# Patient Record
Sex: FEMALE | Race: AMERICAN INDIAN OR ALASKA NATIVE
[De-identification: names, ages, dates, MRNs, and addresses within clinical notes are randomized per-mention and may not be internally consistent; named-entity substitution may affect disease eponyms.]

---

## 2019-02-25 ENCOUNTER — HOSPITAL ENCOUNTER (EMERGENCY)
Dept: HOSPITAL 5 - ED | Age: 24
Discharge: HOME | End: 2019-02-25
Payer: MEDICAID

## 2019-02-25 VITALS — DIASTOLIC BLOOD PRESSURE: 72 MMHG | SYSTOLIC BLOOD PRESSURE: 102 MMHG

## 2019-02-25 DIAGNOSIS — K64.4: ICD-10-CM

## 2019-02-25 DIAGNOSIS — K52.9: Primary | ICD-10-CM

## 2019-02-25 LAB
ALBUMIN SERPL-MCNC: 4.2 G/DL (ref 3.9–5)
ALT SERPL-CCNC: 8 UNITS/L (ref 7–56)
APTT BLD: 31.2 SEC. (ref 24.2–36.6)
BASOPHILS # (AUTO): 0 K/MM3 (ref 0–0.1)
BASOPHILS NFR BLD AUTO: 0.2 % (ref 0–1.8)
BUN SERPL-MCNC: 14 MG/DL (ref 7–17)
BUN/CREAT SERPL: 18 %
CALCIUM SERPL-MCNC: 9 MG/DL (ref 8.4–10.2)
EOSINOPHIL # BLD AUTO: 0.1 K/MM3 (ref 0–0.4)
EOSINOPHIL NFR BLD AUTO: 1.4 % (ref 0–4.3)
HCT VFR BLD CALC: 36.9 % (ref 30.3–42.9)
HEMOLYSIS INDEX: 7
HGB BLD-MCNC: 12.7 GM/DL (ref 10.1–14.3)
INR PPP: 1.04 (ref 0.87–1.13)
LYMPHOCYTES # BLD AUTO: 2.4 K/MM3 (ref 1.2–5.4)
LYMPHOCYTES NFR BLD AUTO: 31 % (ref 13.4–35)
MCHC RBC AUTO-ENTMCNC: 34 % (ref 30–34)
MCV RBC AUTO: 90 FL (ref 79–97)
MONOCYTES # (AUTO): 0.5 K/MM3 (ref 0–0.8)
MONOCYTES % (AUTO): 6.3 % (ref 0–7.3)
PLATELET # BLD: 264 K/MM3 (ref 140–440)
RBC # BLD AUTO: 4.11 M/MM3 (ref 3.65–5.03)

## 2019-02-25 PROCEDURE — 85025 COMPLETE CBC W/AUTO DIFF WBC: CPT

## 2019-02-25 PROCEDURE — 36415 COLL VENOUS BLD VENIPUNCTURE: CPT

## 2019-02-25 PROCEDURE — 83690 ASSAY OF LIPASE: CPT

## 2019-02-25 PROCEDURE — 86901 BLOOD TYPING SEROLOGIC RH(D): CPT

## 2019-02-25 PROCEDURE — 85610 PROTHROMBIN TIME: CPT

## 2019-02-25 PROCEDURE — 80053 COMPREHEN METABOLIC PANEL: CPT

## 2019-02-25 PROCEDURE — 86850 RBC ANTIBODY SCREEN: CPT

## 2019-02-25 PROCEDURE — 85730 THROMBOPLASTIN TIME PARTIAL: CPT

## 2019-02-25 PROCEDURE — 86900 BLOOD TYPING SEROLOGIC ABO: CPT

## 2019-02-25 NOTE — EMERGENCY DEPARTMENT REPORT
Vomiting/Diarrhea





- HPI


Chief Complaint: GI Bleed


Stated Complaint: N/V


Time Seen by Provider: 02/25/19 09:00


Duration: 3 Days


Severity: mild


Nausea/Vomiting Severity: None


Diarrhea Severity: Mild


Pain Location: Generalized (abdominal but resolved 2 days ago)


Pain Severity: None


Symptoms: Yes Watery Diarrhea, Yes Bloody diarrhea (1 episode with bright red 

only a drop), Yes Able to Tolerate Fluids, Yes Recent Unusual Foods, No Fever, 

No Recent Untreated Water, No Recent use of Antibiotics, No Family w/ Similar 

Symptoms, No Contacts w/ Similar Symptoms, No Rash, No Hematuria, No Recent URI 

Symptoms


Other History: Patient is a 23-year-old healthy female presents to ED 

complaining of one episode of seeing blood in her stool.  She denies 

fevers/chills/nausea vomiting/abdominal pain.





ED Review of Systems


ROS: 


Stated complaint: N/V


Other details as noted in HPI





Comment: All other systems reviewed and negative





ED Past Medical Hx





- Past Medical History


Previous Medical History?: Yes


Hx Hypertension: No


Hx Diabetes: No


Hx Deep Vein Thrombosis: No


Hx Renal Disease: No


Hx Sickle Cell Disease: No


Hx Seizures: No


Hx Asthma: No


Hx HIV: No


Additional medical history: colitis.  c-diff (May 2018)





- Surgical History


Past Surgical History?: No





- Social History


Smoking Status: Never Smoker


Substance Use Type: None





- Medications


Home Medications: 


                                Home Medications











 Medication  Instructions  Recorded  Confirmed  Last Taken  Type


 


Mag Hydrox/Aluminum Hyd/Simeth 15 ml PO TID #1 oral.susp 02/25/19  Unknown Rx





[Maalox Advanced Suspension]     














Vomiting Diarrhea Exam





- Exam


General: 


Vital signs noted. No distress. Alert and acting appropriately.





HEENT: Yes Moist Mucous Membranes, No Pharyngeal Erythema, No Pharyngeal 

Exudates, No Rhinorrhea, No Conjuctival Injection, No Frontal Tenderness, No 

Maxillary Tenderness


Neck: No Adenopathy, No Rigidity


Lungs: Yes Clear Lung Sounds, Yes Good Air Exchange, No Wheezes, No Stridor, No 

Cough, No Nasal Flaring, No Retractions, No Use of Accessory Muscles


Heart exam: Regular: Yes, Murmur: No, Tachycardia: No


Abdomen: Tenderness: No, Peritoneal Signs: No, Distention: No, Hyperactive Bowel

sounds: No


Skin exam: Rash: No, Edema: No, Normal turgor: Yes


Neurologic: 


Alert and oriented, no deficits.








Musculoskeletal: 


Unremarkable.








Exam: Rectum: Mild first-degree hemorrhoid, nonbleeding, no swelling.





ED Course


                                   Vital Signs











  02/25/19 02/25/19





  07:31 08:00


 


Temperature 97.4 F L 


 


Pulse Rate 75 


 


Respiratory 16 18





Rate  


 


Blood Pressure 102/72 


 


O2 Sat by Pulse 100 99





Oximetry  














ED Medical Decision Making





- Lab Data


Result diagrams: 


                                 02/25/19 07:51





                                 02/25/19 07:51





- Medical Decision Making





23-year-old female presents to mild gastroenteritis


All labs are within normal limits.


Patient is in no acute distress upon evaluation, nontender abdomen


Vital signs are normal, she is in no acute distress,


Critical care attestation.: 


If time is entered above; I have spent that time in minutes in the direct care 

of this critically ill patient, excluding procedure time.








ED Disposition


Clinical Impression: 


 Gastroenteritis, External hemorrhoid





Disposition: DC-01 TO HOME OR SELFCARE


Is pt being admited?: No


Does the pt Need Aspirin: No


Condition: Stable


Instructions:  Gastroenteritis (ED), Hemorrhoids (ED)


Additional Instructions: 


Make sure to follow up with the primary care physician as discussed.


Take all your medications as you've been prescribed.


If you have any worsening symptoms or develop new symptoms please return to ED 

immediately.


Prescriptions: 


Mag Hydrox/Aluminum Hyd/Simeth [Maalox Advanced Suspension] 15 ml PO TID #1 

oral.susp


Referrals: 


[facility], [place] [Primary Care Provider] - 3-5 Days


Forms:  Accompanied Note, Work/School Release Form(ED)


Time of Disposition: 09:29

## 2019-11-20 ENCOUNTER — HOSPITAL ENCOUNTER (EMERGENCY)
Dept: HOSPITAL 5 - ED | Age: 24
Discharge: HOME | End: 2019-11-20
Payer: MEDICAID

## 2019-11-20 VITALS — SYSTOLIC BLOOD PRESSURE: 110 MMHG | DIASTOLIC BLOOD PRESSURE: 70 MMHG

## 2019-11-20 DIAGNOSIS — J06.9: Primary | ICD-10-CM

## 2019-11-20 DIAGNOSIS — Z79.899: ICD-10-CM

## 2019-11-20 NOTE — EMERGENCY DEPARTMENT REPORT
- General


Chief Complaint: Upper Respiratory Infection


Stated Complaint: WEAK/BODY ACHES


Time Seen by Provider: 11/20/19 08:33


Source: patient


Mode of arrival: Ambulatory


Limitations: No Limitations





- History of Present Illness


MD Complaint: rhinorrhea, nasal congestion


-: Sudden, days(s) (1)


Consistency: constant


Improves With: nothing


Worsens With: nothing


Associated Symptoms: chills, rhinorrhea, nasal congestion.  denies: myalgias, 

diaphoresis, chest pain, shortness of breath, nausea, vomiting





- Related Data


                                  Previous Rx's











 Medication  Instructions  Recorded  Last Taken  Type


 


Mag Hydrox/Aluminum Hyd/Simeth 15 ml PO TID #1 oral.susp 02/25/19 Unknown Rx





[Maalox Advanced Suspension]    


 


Brompheniramine/Pseudoephed/Dm 5 ml PO Q6H PRN #240 syrup 11/20/19 Unknown Rx





[Zbmerocqbl-Rjkbkrfuekx-Qt Syr]    


 


Ketorolac [Toradol] 10 mg PO Q6H PRN #15 tablet 11/20/19 Unknown Rx


 


predniSONE [Deltasone] 50 mg PO QDAY #5 tab 11/20/19 Unknown Rx











                                    Allergies











Allergy/AdvReac Type Severity Reaction Status Date / Time


 


No Known Allergies Allergy   Verified 07/16/15 13:51














ED Review of Systems


ROS: 


Stated complaint: WEAK/BODY ACHES


Other details as noted in HPI





Comment: All other systems reviewed and negative





ED Past Medical Hx





- Past Medical History


Previous Medical History?: No


Hx Hypertension: No


Hx Diabetes: No


Hx Deep Vein Thrombosis: No


Hx Renal Disease: No


Hx Sickle Cell Disease: No


Hx Seizures: No


Hx Asthma: No


Hx HIV: No


Additional medical history: colitis.  c-diff (May 2018)





- Surgical History


Past Surgical History?: No





- Social History


Smoking Status: Never Smoker


Substance Use Type: None





- Medications


Home Medications: 


                                Home Medications











 Medication  Instructions  Recorded  Confirmed  Last Taken  Type


 


Mag Hydrox/Aluminum Hyd/Simeth 15 ml PO TID #1 oral.susp 02/25/19  Unknown Rx





[Maalox Advanced Suspension]     


 


Brompheniramine/Pseudoephed/Dm 5 ml PO Q6H PRN #240 syrup 11/20/19  Unknown Rx





[Bcdynfsrnv-Qezfprnqtrh-Pr Syr]     


 


Ketorolac [Toradol] 10 mg PO Q6H PRN #15 tablet 11/20/19  Unknown Rx


 


predniSONE [Deltasone] 50 mg PO QDAY #5 tab 11/20/19  Unknown Rx














ED Physical Exam





- General


Limitations: No Limitations


General appearance: alert, in no apparent distress





- Head


Head exam: Present: atraumatic, normocephalic





- Eye


Eye exam: Present: normal appearance, PERRL, EOMI





- ENT


ENT exam: Present: normal orophraynx, mucous membranes moist, other (nasal 

congestion bilaterally cleared for discharge.  Posterior pharynx is clear.  

Airway patent.  No lymphadenopathy noted.)





- Neck


Neck exam: Present: normal inspection





- Respiratory


Respiratory exam: Present: normal lung sounds bilaterally.  Absent: respiratory 

distress





- Cardiovascular


Cardiovascular Exam: Present: regular rate, normal rhythm.  Absent: systolic 

murmur, diastolic murmur, rubs, gallop





- GI/Abdominal


GI/Abdominal exam: Present: soft, normal bowel sounds





- Extremities Exam


Extremities exam: Present: normal inspection





- Back Exam


Back exam: Present: normal inspection





- Neurological Exam


Neurological exam: Present: alert, oriented X3





- Psychiatric


Psychiatric exam: Present: normal affect, normal mood





- Skin


Skin exam: Present: warm, dry, intact, normal color.  Absent: rash





ED Course





                                   Vital Signs











  11/20/19





  08:18


 


Pulse Rate 112 H


 


Respiratory 16





Rate 


 


Blood Pressure 109/66





[Right] 


 


O2 Sat by Pulse 99





Oximetry 











Critical care attestation.: 


If time is entered above; I have spent that time in minutes in the direct care 

of this critically ill patient, excluding procedure time.








ED Disposition


Clinical Impression: 


 Upper respiratory infection





Disposition: DC-01 TO HOME OR SELFCARE


Is pt being admited?: No


Does the pt Need Aspirin: No


Condition: Stable


Instructions:  Upper Respiratory Infection (ED), Cold Symptoms (ED), Viral 

Syndrome (ED)


Prescriptions: 


Brompheniramine/Pseudoephed/Dm [Kowseynoom-Ydztrzhyall-Az Syr] 5 ml PO Q6H PRN 

#240 syrup


 PRN Reason: Cough


predniSONE [Deltasone] 50 mg PO QDAY #5 tab


Ketorolac [Toradol] 10 mg PO Q6H PRN #15 tablet


 PRN Reason: Pain


Referrals: 


[facility] [Provider Group] - 3-5 Days

## 2022-02-09 NOTE — EMERGENCY DEPARTMENT REPORT
ED Dysuria HPI





- HPI


Chief Complaint: Urogenital-Female


Stated Complaint: POSS YEAST INFECTION


Duration: 2 Days


Location of Discomfort: Other


Severity: Mild


Symptoms: Dysuria: No, Frequency: No, Suprapubic Pain: No, Flank Pain: No, 

Fever: No, Hematuria: No, Abdominal Pain: No, Previous UTI's: No


Other History: Ms. Rodriguez is a 26-year-old female that comes to the emergency 

room with white vaginal discharge after completing her antibiotics.  She did 

call her doctor but they were unable to get her in so she presents to the ER.  

She is not concerned for STDs.  She has no dysuria.  No abdominal pain.  No back

pain.  No fever or chills.  She did not try any Monistat over-the-counter.  She 

comes to the emergency room for Diflucan.





ED Review of Systems


ROS: 


Stated complaint: POSS YEAST INFECTION


Other details as noted in HPI





Comment: All other systems reviewed and negative





ED Past Medical Hx





- Past Medical History


Previous Medical History?: No


Hx Hypertension: No


Hx Diabetes: No


Hx Deep Vein Thrombosis: No


Hx Renal Disease: No


Hx Sickle Cell Disease: No


Hx Seizures: Yes


Hx Asthma: No


Hx HIV: No


Additional medical history: colitis.  c-diff (May 2018)





- Surgical History


Past Surgical History?: No





- Family History


Family history: no significant





- Social History


Smoking Status: Never Smoker


Substance Use Type: None





- Medications


Home Medications: 


                                Home Medications











 Medication  Instructions  Recorded  Confirmed  Last Taken  Type


 


Mag Hydrox/Aluminum Hyd/Simeth 15 ml PO TID #1 oral.susp 02/25/19  Unknown Rx





[Maalox Advanced Suspension]     


 


Brompheniramine/Pseudoephed/Dm 5 ml PO Q6H PRN #240 syrup 11/20/19  Unknown Rx





[Ewsootznwi-Cipwgvfpvao-Kd Syr]     


 


Ketorolac [Toradol] 10 mg PO Q6H PRN #15 tablet 11/20/19  Unknown Rx


 


predniSONE [Deltasone] 50 mg PO QDAY #5 tab 11/20/19  Unknown Rx


 


Acetaminophen/Codeine [Tylenol 1 tab PO Q6H PRN #12 tab 01/11/20  Unknown Rx





/Codeine # 3 tab]     


 


Ibuprofen [Motrin] 600 mg PO Q8H PRN #24 tablet 01/11/20  Unknown Rx


 


Neomycn/Bacitrc/Polymyx/Pramox 1 applicatio TP Q8H #1 tube 01/11/20  Unknown Rx





[Neosporin + Pain Relief Oint]     


 


cephALEXin [Keflex] 500 mg PO Q8HR #30 cap 01/11/20  Unknown Rx


 


Fluconazole [Diflucan TAB] 100 mg PO QDAY #3 tablet 02/09/22  Unknown Rx














Dysuria Exam





- Exam


General: 


Vital signs noted. No distress. Alert and acting appropriately.





Exam: Yes Moist Mucous Membranes, No CVA Tenderness, No Abdominal Tenderness, No

Rigidity or Guarding





ED Course


                                   Vital Signs











  02/09/22





  08:51


 


Temperature 98.0 F


 


Pulse Rate 72


 


Respiratory 14





Rate 


 


Blood Pressure 105/62


 


O2 Sat by Pulse 98





Oximetry 














ED Medical Decision Making





- Medical Decision Making





Patient has no concern for STDs.  She has no dysuria.  No abdominal pain.  No 

nausea vomiting diarrhea.  No fever or chills.





Thick white discharge after completing her antibiotics.  She was on amoxicillin 

for 10 days.





Patient educated on over-the-counter yeast treatment.





I have given her Diflucan tabs.





Patient being discharged home with discharge plan of care including meds, diet, 

activity and follow-up.  She understands she needs to follow-up with her OB/GYN.





                                   Vital Signs











  02/09/22





  08:51


 


Temperature 98.0 F


 


Pulse Rate 72


 


Respiratory 14





Rate 


 


Blood Pressure 105/62


 


O2 Sat by Pulse 98





Oximetry 














- Differential Diagnosis


Vaginitis


Critical care attestation.: 


If time is entered above; I have spent that time in minutes in the direct care 

of this critically ill patient, excluding procedure time.








ED Disposition


Clinical Impression: 


Vaginitis


Qualifiers:


 Chronicity: acute Qualified Code(s): N76.0 - Acute vaginitis





Disposition: 01 HOME / SELF CARE / HOMELESS


Is pt being admited?: No


Does the pt Need Aspirin: No


Condition: Stable


Additional Instructions: 


FOLLOW UP WITH OBGYN AS WE DISCUSSED





EAT YOGURT DAILY





MED AS ORDERED TODAY





STAY WELL HYDRATED


Prescriptions: 


Fluconazole [Diflucan TAB] 100 mg PO QDAY #3 tablet


Referrals: 


HUYEN BUCHANAN MD [Staff Physician] - 3-5 Days


Time of Disposition: 09:04

## 2022-05-07 NOTE — EMERGENCY DEPARTMENT REPORT
ED General Adult HPI





- General


Chief complaint: Skin/Abscess/Foreign Body


Stated complaint: BOIL UNDER ARM/IRRATION


Time Seen by Provider: 05/07/22 12:17


Source: patient


Mode of arrival: Ambulatory


Limitations: No Limitations





- History of Present Illness


Initial comments: 


26-year-old female who presents for boil to right axillary.  Patient has history

of hydradenitis.  States this is a recurring issue for her.  Patient took 

amoxicillin last week however did not get better.  Patient denies fevers or 

chills no nausea no vomiting.  States that she would manually express some pus 

this morning in the shower.  Symptoms are exacerbated by palpation.  Symptoms 

are relieved by nothing tried.  Patient does not have diabetes.








- Related Data


                                  Previous Rx's











 Medication  Instructions  Recorded  Last Taken  Type


 


Mag Hydrox/Aluminum Hyd/Simeth 15 ml PO TID #1 oral.susp 02/25/19 Unknown Rx





[Maalox Advanced Suspension]    


 


Brompheniramine/Pseudoephed/Dm 5 ml PO Q6H PRN #240 syrup 11/20/19 Unknown Rx





[Kcxtliczmr-Wtrwsowysji-Eh Syr]    


 


Ketorolac [Toradol] 10 mg PO Q6H PRN #15 tablet 11/20/19 Unknown Rx


 


predniSONE [Deltasone] 50 mg PO QDAY #5 tab 11/20/19 Unknown Rx


 


Acetaminophen/Codeine [Tylenol 1 tab PO Q6H PRN #12 tab 01/11/20 Unknown Rx





/Codeine # 3 tab]    


 


Ibuprofen [Motrin] 600 mg PO Q8H PRN #24 tablet 01/11/20 Unknown Rx


 


Neomycn/Bacitrc/Polymyx/Pramox 1 applicatio TP Q8H #1 tube 01/11/20 Unknown Rx





[Neosporin + Pain Relief Oint]    


 


cephALEXin [Keflex] 500 mg PO Q8HR #30 cap 01/11/20 Unknown Rx


 


Fluconazole [Diflucan TAB] 100 mg PO QDAY #3 tablet 02/09/22 Unknown Rx


 


cephALEXin [Keflex] 500 mg PO Q8H 7 Days #21 cap 05/07/22 Unknown Rx


 


traMADoL [Ultram] 50 mg PO Q6HR PRN #12 tablet 05/07/22 Unknown Rx











                                    Allergies











Allergy/AdvReac Type Severity Reaction Status Date / Time


 


No Known Allergies Allergy   Verified 07/16/15 13:51














ED Review of Systems


ROS: 


Stated complaint: BOIL UNDER ARM/IRRATION


Other details as noted in HPI





Constitutional: denies: chills, fever


Eyes: denies: eye pain, eye discharge, vision change


ENT: denies: ear pain, throat pain


Respiratory: denies: cough, shortness of breath, wheezing


Cardiovascular: denies: chest pain, palpitations


Endocrine: no symptoms reported


Gastrointestinal: denies: abdominal pain, nausea, diarrhea


Genitourinary: denies: urgency, dysuria, discharge


Musculoskeletal: denies: back pain, joint swelling, arthralgia


Skin: other (Abscess right axillary 1 x 2 cm)


Neurological: denies: headache, weakness, paresthesias


Psychiatric: denies: anxiety, depression


Hematological/Lymphatic: denies: easy bleeding, easy bruising





ED Past Medical Hx





- Past Medical History


Previous Medical History?: Yes


Hx Hypertension: No


Hx Diabetes: No


Hx Deep Vein Thrombosis: No


Hx Renal Disease: No


Hx Sickle Cell Disease: No


Hx Seizures: Yes


Hx Asthma: No


Hx HIV: No


Additional medical history: colitis.  c-diff (May 2018), Boil





- Surgical History


Past Surgical History?: No





- Social History


Smoking Status: Never Smoker


Substance Use Type: None





- Medications


Home Medications: 


                                Home Medications











 Medication  Instructions  Recorded  Confirmed  Last Taken  Type


 


Mag Hydrox/Aluminum Hyd/Simeth 15 ml PO TID #1 oral.susp 02/25/19  Unknown Rx





[Maalox Advanced Suspension]     


 


Brompheniramine/Pseudoephed/Dm 5 ml PO Q6H PRN #240 syrup 11/20/19  Unknown Rx





[Pvbuifpime-Hgalusaecjm-Nj Syr]     


 


Ketorolac [Toradol] 10 mg PO Q6H PRN #15 tablet 11/20/19  Unknown Rx


 


predniSONE [Deltasone] 50 mg PO QDAY #5 tab 11/20/19  Unknown Rx


 


Acetaminophen/Codeine [Tylenol 1 tab PO Q6H PRN #12 tab 01/11/20  Unknown Rx





/Codeine # 3 tab]     


 


Ibuprofen [Motrin] 600 mg PO Q8H PRN #24 tablet 01/11/20  Unknown Rx


 


Neomycn/Bacitrc/Polymyx/Pramox 1 applicatio TP Q8H #1 tube 01/11/20  Unknown Rx





[Neosporin + Pain Relief Oint]     


 


cephALEXin [Keflex] 500 mg PO Q8HR #30 cap 01/11/20  Unknown Rx


 


Fluconazole [Diflucan TAB] 100 mg PO QDAY #3 tablet 02/09/22  Unknown Rx


 


cephALEXin [Keflex] 500 mg PO Q8H 7 Days #21 cap 05/07/22  Unknown Rx


 


traMADoL [Ultram] 50 mg PO Q6HR PRN #12 tablet 05/07/22  Unknown Rx














ED Physical Exam





- General


Limitations: No Limitations


General appearance: alert, in no apparent distress





- Head


Head exam: Present: atraumatic, normocephalic





- Eye


Eye exam: Present: normal appearance, EOMI


Pupils: Present: normal accommodation





- ENT


ENT exam: Present: mucous membranes moist





- Neck


Neck exam: Present: normal inspection, full ROM.  Absent: tenderness





- Respiratory


Respiratory exam: Present: normal lung sounds bilaterally.  Absent: respiratory 

distress, wheezes, stridor





- Cardiovascular


Cardiovascular Exam: Present: regular rate, normal rhythm, normal heart sounds. 

 Absent: systolic murmur, diastolic murmur, rubs, gallop





- GI/Abdominal


GI/Abdominal exam: Present: soft, normal bowel sounds.  Absent: distended, 

tenderness





- Rectal


Rectal exam: Present: deferred





- Extremities Exam


Extremities exam: Present: normal inspection





- Back Exam


Back exam: Present: normal inspection, full ROM.  Absent: CVA tenderness (R), 

CVA tenderness (L)





- Neurological Exam


Neurological exam: Present: alert, oriented X3, CN II-XII intact





- Psychiatric


Psychiatric exam: Present: normal affect





- Skin


Skin exam: Present: warm, dry, erythema, other (Abscess right axillary 1 x 2 cm 

fluctuant erythema painful to touch warm to touch no drainage.)





ED Course


                                   Vital Signs











  05/07/22





  10:52


 


Temperature 98.1 F


 


Pulse Rate 85


 


Respiratory 18





Rate 


 


Blood Pressure 108/70


 


O2 Sat by Pulse 98





Oximetry 














- I & D


  ** Right Arm


Type of Procedure: Simple


Site: Right axillary abscess 1 x 2 cm there is no erythema fluctuance warm to 

hilda


Blade Size: 11


I & D Procedure: betadine prep, sterile drapes applied, sterile dressing applied


Progress: 


Right axillar abscess anesthesia with 1% lidocaine x2 cc anesthesia is achieved.

  Site cleaned with Betadine solution.  Incision with 11 blade scalpel.  

Loculations broken up with six-inch blunt forceps.  There is moderate purulent 

drainage.  Site irrigated with 10 cc sterile saline.  Sterile dressing applied. 

 Patient given wound care instructions.  Patient verbalized agreement 

understanding of same. patient tolerated procedure with minimal distress. 








ED Medical Decision Making





- Medical Decision Making


Abscess right axillary incision and drainage see procedure note.  Sterile 

dressings intact patient given follow-up care instructions including follow-up 

with primary care doctor in 2 days.  And wound care instructions.  Patient DC to

 home in stable condition at this time.





Critical care attestation.: 


If time is entered above; I have spent that time in minutes in the direct care 

of this critically ill patient, excluding procedure time.








ED Disposition


Clinical Impression: 


 Hydradenitis, Abscess of axilla, right





Disposition: 01 HOME / SELF CARE / HOMELESS


Is pt being admited?: No


Does the pt Need Aspirin: No


Condition: Stable


Instructions:  Incision and Drainage, Care After, Hidradenitis Suppurativa


Additional Instructions: 


Take medications as prescribed, follow-up with your primary care doctor in 2 to 

3 days.  Return to emergency department should symptoms worsen.


Prescriptions: 


cephALEXin [Keflex] 500 mg PO Q8H 7 Days #21 cap


traMADoL [Ultram] 50 mg PO Q6HR PRN #12 tablet


 PRN Reason: Pain


Referrals: 


SHARI COURTNEY DO [Staff Physician] - 3-5 Days


Forms:  Work/School Release Form(ED)


Time of Disposition: 13:10

## 2022-08-13 NOTE — XRAY REPORT
LEFT KNEE 3 VIEW(S)



INDICATION / CLINICAL INFORMATION: leg pain



COMPARISON: None available.

 

FINDINGS:



BONES / JOINT(S): No acute fracture or subluxation. No significant arthritis.



SOFT TISSUES: No significant abnormality.



ADDITIONAL FINDINGS: None.







Signer Name: Zohaib Erwin DO 

Signed: 8/13/2022 12:45 PM

Workstation Name: [id number]

## 2022-08-13 NOTE — EMERGENCY DEPARTMENT REPORT
ED General Adult HPI





- General


Chief complaint: Extremity Injury, Lower


Stated complaint: L LEG SWOLLEN AND PAIN LEFT SHOULDER PAIN


Time Seen by Provider: 22 10:43


Source: patient


Mode of arrival: Ambulatory


Limitations: No Limitations





- History of Present Illness


Initial comments: 





27-year-old female no significant past medical history reports to the ER with 

complaints of posterior left knee pain with swelling for about 1 month.  Patient

reports no shortness of breath, no history of smoking, currently on no birth 

control.  No reports of long travel within the car.  Patient does report she 

stands a lot at work.  Patient reports pain is about 7 out of 10.  Patient 

reports no other acute signs or symptoms.


Severity scale (0 -10): 5





- Related Data


                                  Previous Rx's











 Medication  Instructions  Recorded  Last Taken  Type


 


Mag Hydrox/Aluminum Hyd/Simeth 15 ml PO TID #1 oral.susp 19 Unknown Rx





[Maalox Advanced Suspension]    


 


Brompheniramine/Pseudoephed/Dm 5 ml PO Q6H PRN #240 syrup 19 Unknown Rx





[Ouhszvbitg-Lvcluxbwaky-St Syr]    


 


Ketorolac [Toradol] 10 mg PO Q6H PRN #15 tablet 19 Unknown Rx


 


predniSONE [Deltasone] 50 mg PO QDAY #5 tab 19 Unknown Rx


 


Acetaminophen/Codeine [Tylenol 1 tab PO Q6H PRN #12 tab 20 Unknown Rx





/Codeine # 3 tab]    


 


Ibuprofen [Motrin] 600 mg PO Q8H PRN #24 tablet 20 Unknown Rx


 


Neomycn/Bacitrc/Polymyx/Pramox 1 applicatio TP Q8H #1 tube 20 Unknown Rx





[Neosporin + Pain Relief Oint]    


 


cephALEXin [Keflex] 500 mg PO Q8HR #30 cap 20 Unknown Rx


 


Fluconazole [Diflucan TAB] 100 mg PO QDAY #3 tablet 22 Unknown Rx


 


cephALEXin [Keflex] 500 mg PO Q8H 7 Days #21 cap 22 Unknown Rx


 


traMADoL [Ultram] 50 mg PO Q6HR PRN #12 tablet 22 Unknown Rx


 


Ibuprofen [Motrin] 800 mg PO Q8HR PRN 6 Days #18 22 Unknown Rx





 tablet   


 


methOCARBAMOL [Robaxin TAB] 500 mg PO BID PRN 7 Days #14 tab 22 Unknown Rx











                                    Allergies











Allergy/AdvReac Type Severity Reaction Status Date / Time


 


No Known Allergies Allergy   Verified 07/16/15 13:51














ED Review of Systems


ROS: 


Stated complaint: L LEG SWOLLEN AND PAIN LEFT SHOULDER PAIN


Other details as noted in HPI





Comment: All other systems reviewed and negative


Musculoskeletal: other (Left knee posterior pain with slight swelling noted.)





ED Past Medical Hx





- Past Medical History


Previous Medical History?: Yes


Hx Hypertension: No


Hx Diabetes: No


Hx Deep Vein Thrombosis: No


Hx Renal Disease: No


Hx Sickle Cell Disease: No


Hx Seizures: Yes


Hx Asthma: No


Hx HIV: No


Additional medical history: colitis.  c-diff (May 2018), Boil, Vaginal delivery 

x 3





- Surgical History


Past Surgical History?: No





- Social History


Smoking Status: Never Smoker


Substance Use Type: None





- Medications


Home Medications: 


                                Home Medications











 Medication  Instructions  Recorded  Confirmed  Last Taken  Type


 


Mag Hydrox/Aluminum Hyd/Simeth 15 ml PO TID #1 oral.susp 19  Unknown Rx





[Maalox Advanced Suspension]     


 


Brompheniramine/Pseudoephed/Dm 5 ml PO Q6H PRN #240 syrup 19  Unknown Rx





[Mmcfsyrjbc-Wqxfrrkmxip-Bj Syr]     


 


Ketorolac [Toradol] 10 mg PO Q6H PRN #15 tablet 19  Unknown Rx


 


predniSONE [Deltasone] 50 mg PO QDAY #5 tab 19  Unknown Rx


 


Acetaminophen/Codeine [Tylenol 1 tab PO Q6H PRN #12 tab 20  Unknown Rx





/Codeine # 3 tab]     


 


Ibuprofen [Motrin] 600 mg PO Q8H PRN #24 tablet 20  Unknown Rx


 


Neomycn/Bacitrc/Polymyx/Pramox 1 applicatio TP Q8H #1 tube 20  Unknown Rx





[Neosporin + Pain Relief Oint]     


 


cephALEXin [Keflex] 500 mg PO Q8HR #30 cap 20  Unknown Rx


 


Fluconazole [Diflucan TAB] 100 mg PO QDAY #3 tablet 22  Unknown Rx


 


cephALEXin [Keflex] 500 mg PO Q8H 7 Days #21 cap 22  Unknown Rx


 


traMADoL [Ultram] 50 mg PO Q6HR PRN #12 tablet 22  Unknown Rx


 


Ibuprofen [Motrin] 800 mg PO Q8HR PRN 6 Days #18 22  Unknown Rx





 tablet    


 


methOCARBAMOL [Robaxin TAB] 500 mg PO BID PRN 7 Days #14 tab 22  Unknown 

Rx














ED Physical Exam





- General


Limitations: No Limitations


General appearance: alert, in no apparent distress





- Head


Head exam: Present: atraumatic, normocephalic





- Eye


Eye exam: Present: normal appearance





- ENT


ENT exam: Present: mucous membranes moist





- Neck


Neck exam: Present: normal inspection





- Respiratory


Respiratory exam: Present: normal lung sounds bilaterally.  Absent: respiratory 

distress





- Cardiovascular


Cardiovascular Exam: Present: regular rate, normal rhythm.  Absent: systolic 

murmur, diastolic murmur, rubs, gallop





- GI/Abdominal


GI/Abdominal exam: Present: soft, normal bowel sounds





- Extremities Exam


Extremities exam: Present: normal inspection





- Expanded Lower Extremity Exam


  ** Left


Knee exam: Present: full ROM, tenderness (Posterior), swelling (Posterior).  

Absent: erythema





- Back Exam


Back exam: Present: normal inspection





- Neurological Exam


Neurological exam: Present: alert, oriented X3





- Psychiatric


Psychiatric exam: Present: normal affect, normal mood





- Skin


Skin exam: Present: warm, dry, intact, normal color.  Absent: rash





ED Course


                                   Vital Signs











  22





  08:47


 


Temperature 98.8 F


 


Pulse Rate 84


 


Respiratory 20





Rate 


 


Blood Pressure 104/70





[Right] 


 


O2 Sat by Pulse 99





Oximetry 














ED Medical Decision Making





- Radiology Data





[facility]  


                                     [address] [place] [address]  


 


                                            XRay Report   


                                               Signed  


 


Patient: JUAN CARLOS HILLS                                                  

              MR#: [id number]  


[id number]          


: 1995                                                                

Acct:[id number]      


 


Age/Sex: 27 / F                                                                

ADM Date: 22     


 


Loc: ED       


Attending Dr:   


 


 


Ordering Physician: BENTLEY FRANKLIN NP  


Date of Service: 22  


Procedure(s): XR knee 3V LT  


Accession Number(s): [id number]  


 


cc: BENTLEY FRANKLIN NP   


 


Fluoro Time In Minutes:   


 


LEFT KNEE 3 VIEW(S)  


 


 INDICATION / CLINICAL INFORMATION: leg pain  


 


 COMPARISON: None available.  


 


 FINDINGS:  


 


 BONES / JOINT(S): No acute fracture or subluxation. No significant arthritis.  


 


 SOFT TISSUES: No significant abnormality.  


 


 ADDITIONAL FINDINGS: None.  


 


 


 


 Signer Name: Zohaib POPE DO Rip   


 Signed: 2022 12:45 PM  


 Workstation Name: [id number]   


 


 


Transcribed By: NS  


Dictated By: ZOHAIB TREJO DO  


Electronically Authenticated By: ZOHAIB TREJO DO    


Signed Date/Time: 22 1245                                


 


 


 


DD/DT: 22 1244                                                            

  


TD/TT:














[address] [address]  


 


                                        Vascular Lab Report   


                                               Signed  


 


Patient: JUAN CARLOS HILLS                                                  

              MR#: [id number]  


[id number]          


: 1995                                                                

Acct:[id number]      


 


Age/Sex: 27 / F                                                                

ADM Date: 22     


 


Loc: ED       


Attending Dr:   


 


 


Ordering Physician: BENTLEY FRANKLIN NP  


Date of Service: 22  


Procedure(s): VL venous duplex LE LT  


Accession Number(s): [id number]  


 


cc: BENTLEY FRANKLIN NP   


 


 


DUPLEX DOPPLER LOWER EXTREMITY VEINS, LEFT  


 


 INDICATION / CLINICAL INFORMATION:  


 left leg pain with swelling.  


 


 TECHNIQUE:  


 Duplex doppler imaging was performed through the veins of the left lower 

extremity using venous 


compression and other maneuvers.  


 


 COMPARISON:   


 None available.  


 


 FINDINGS:  


 


 LEFT COMMON FEMORAL VEIN: Negative.  


 LEFT FEMORAL VEIN: Negative.  


 LEFT POPLITEAL VEIN: Negative.  


 LEFT CALF VEINS: Negative.  


 


 ADDITIONAL FINDINGS: None.  


 


 IMPRESSION:  


 1. No sonographic evidence for DVT in the left lower extremity.  


 


 Signer Name: Jacek Salazar MD   


 Signed: 2022 11:48 AM  


 Workstation Name: [id number]   


 


 


Transcribed By: CALOS  


Dictated By: Jacek Salazar MD  


Electronically Authenticated By: Jacek Salazar MD    


Signed Date/Time: 22 1148                                


 


 


 


DD/DT: 22 1148                                                            

  


TD/TT:





- Medical Decision Making





27-year-old female no significant past medical history reports to the ER with 

complaints of left posterior knee pain with swelling for about 1 month.


On physical exam range of motion is intact.  Tenderness and swelling is noted to

 the posterior side of the left knee.


No erythema noted.


No calf tenderness is noted.


X-rays negative for no acute findings.


Ultrasound is negative for no DVT noted.


Patient informed of her x-ray and ultrasound results.


Patient informed to follow her primary care provider.  Patient agrees with plan 

of care and verbalized understanding.


No further work-up is needed at this time..











                                   Vital Signs











  22





  08:47


 


Temperature 98.8 F


 


Pulse Rate 84


 


Respiratory 20





Rate 


 


Blood Pressure 104/70





[Right] 


 


O2 Sat by Pulse 99





Oximetry 











                                   Vital Signs











  22





  08:47


 


Temperature 98.8 F


 


Pulse Rate 84


 


Respiratory 20





Rate 


 


Blood Pressure 104/70





[Right] 


 


O2 Sat by Pulse 99





Oximetry 











Critical care attestation.: 


If time is entered above; I have spent that time in minutes in the direct care 

of this critically ill patient, excluding procedure time.








ED Disposition


Clinical Impression: 


 Posterior left knee pain





Disposition:  HOME / SELF CARE / HOMELESS


Is pt being admited?: No


Condition: Stable


Instructions:  Acute Knee Pain, Adult, Musculoskeletal Pain, Joint Pain


Prescriptions: 


Ibuprofen [Motrin] 800 mg PO Q8HR PRN 6 Days #18 tablet


 PRN Reason: Pain , Severe (7-10)


methOCARBAMOL [Robaxin TAB] 500 mg PO BID PRN 7 Days #14 tab


 PRN Reason: muscle pain 


Referrals: 


KIZZY JUAREZ MD [Primary Care Provider] - 3-5 Days


[facility] [Outside] - 3-5 Days


[facility] [Outside] - 3-5 Days


[facility] [Outside] - 3-5 Days

## 2022-08-13 NOTE — VASCULAR LAB REPORT
DUPLEX DOPPLER LOWER EXTREMITY VEINS, LEFT



INDICATION / CLINICAL INFORMATION:

left leg pain with swelling.



TECHNIQUE:

Duplex doppler imaging was performed through the veins of the left lower extremity using venous compr
ession and other maneuvers.



COMPARISON: 

None available.



FINDINGS:



LEFT COMMON FEMORAL VEIN: Negative.

LEFT FEMORAL VEIN: Negative.

LEFT POPLITEAL VEIN: Negative.

LEFT CALF VEINS: Negative.



ADDITIONAL FINDINGS: None.



IMPRESSION:

1. No sonographic evidence for DVT in the left lower extremity.



Signer Name: Jacek Salazar MD 

Signed: 8/13/2022 11:48 AM

Workstation Name: [id number]